# Patient Record
Sex: FEMALE | Race: ASIAN | NOT HISPANIC OR LATINO | URBAN - METROPOLITAN AREA
[De-identification: names, ages, dates, MRNs, and addresses within clinical notes are randomized per-mention and may not be internally consistent; named-entity substitution may affect disease eponyms.]

---

## 2024-08-23 ENCOUNTER — APPOINTMENT (OUTPATIENT)
Dept: URBAN - METROPOLITAN AREA CLINIC 208 | Age: 49
Setting detail: DERMATOLOGY
End: 2024-08-26

## 2024-08-23 DIAGNOSIS — L90.5 SCAR CONDITIONS AND FIBROSIS OF SKIN: ICD-10-CM

## 2024-08-23 DIAGNOSIS — L81.4 OTHER MELANIN HYPERPIGMENTATION: ICD-10-CM

## 2024-08-23 DIAGNOSIS — L57.8 OTHER SKIN CHANGES DUE TO CHRONIC EXPOSURE TO NONIONIZING RADIATION: ICD-10-CM

## 2024-08-23 DIAGNOSIS — L91.0 HYPERTROPHIC SCAR: ICD-10-CM

## 2024-08-23 DIAGNOSIS — D18.0 HEMANGIOMA: ICD-10-CM

## 2024-08-23 DIAGNOSIS — D22 MELANOCYTIC NEVI: ICD-10-CM

## 2024-08-23 PROBLEM — D22.72 MELANOCYTIC NEVI OF LEFT LOWER LIMB, INCLUDING HIP: Status: ACTIVE | Noted: 2024-08-23

## 2024-08-23 PROBLEM — D22.5 MELANOCYTIC NEVI OF TRUNK: Status: ACTIVE | Noted: 2024-08-23

## 2024-08-23 PROBLEM — D18.01 HEMANGIOMA OF SKIN AND SUBCUTANEOUS TISSUE: Status: ACTIVE | Noted: 2024-08-23

## 2024-08-23 PROBLEM — D48.5 NEOPLASM OF UNCERTAIN BEHAVIOR OF SKIN: Status: ACTIVE | Noted: 2024-08-23

## 2024-08-23 PROBLEM — D22.61 MELANOCYTIC NEVI OF RIGHT UPPER LIMB, INCLUDING SHOULDER: Status: ACTIVE | Noted: 2024-08-23

## 2024-08-23 PROCEDURE — OTHER OBSERVATION AND MEASURE: OTHER

## 2024-08-23 PROCEDURE — OTHER PHOTO-DOCUMENTATION: OTHER

## 2024-08-23 PROCEDURE — OTHER SUNSCREEN RECOMMENDATIONS: OTHER

## 2024-08-23 PROCEDURE — OTHER COUNSELING: OTHER

## 2024-08-23 PROCEDURE — OTHER OBSERVATION: OTHER

## 2024-08-23 PROCEDURE — 99203 OFFICE O/P NEW LOW 30 MIN: CPT

## 2024-08-23 PROCEDURE — OTHER MIPS QUALITY: OTHER

## 2024-08-23 PROCEDURE — OTHER OTC TREATMENT REGIMEN: OTHER

## 2024-08-23 ASSESSMENT — LOCATION SIMPLE DESCRIPTION DERM
LOCATION SIMPLE: RIGHT FOREHEAD
LOCATION SIMPLE: CHEST
LOCATION SIMPLE: RIGHT UPPER BACK
LOCATION SIMPLE: LEFT SHOULDER
LOCATION SIMPLE: TRAPEZIAL NECK
LOCATION SIMPLE: RIGHT LOWER BACK
LOCATION SIMPLE: RIGHT SHOULDER
LOCATION SIMPLE: LEFT POSTERIOR THIGH

## 2024-08-23 ASSESSMENT — LOCATION DETAILED DESCRIPTION DERM
LOCATION DETAILED: RIGHT MID-UPPER BACK
LOCATION DETAILED: RIGHT ANTERIOR SHOULDER
LOCATION DETAILED: RIGHT SUPERIOR LATERAL MIDBACK
LOCATION DETAILED: MID TRAPEZIAL NECK
LOCATION DETAILED: RIGHT INFERIOR UPPER BACK
LOCATION DETAILED: RIGHT MEDIAL FOREHEAD
LOCATION DETAILED: LEFT ANTERIOR SHOULDER
LOCATION DETAILED: LEFT LATERAL SUPERIOR CHEST
LOCATION DETAILED: RIGHT LATERAL SHOULDER
LOCATION DETAILED: LEFT PROXIMAL POSTERIOR THIGH
LOCATION DETAILED: UPPER STERNUM

## 2024-08-23 ASSESSMENT — LOCATION ZONE DERM
LOCATION ZONE: NECK
LOCATION ZONE: ARM
LOCATION ZONE: LEG
LOCATION ZONE: FACE
LOCATION ZONE: TRUNK

## 2024-08-23 NOTE — HPI: FULL BODY SKIN EXAMINATION
How Severe Are Your Spot(S)?: moderate
What Type Of Note Output Would You Prefer (Optional)?: Standard Output
What Is The Reason For Today's Visit?: Initial Full Body Skin Examination
What Is The Reason For Today's Visit? (Being Monitored For X): concerning skin lesions on an annual basis
Additional History: She denies having any moles o4 skin lesions of concern.

## 2024-08-23 NOTE — PROCEDURE: SUNSCREEN RECOMMENDATIONS
General Sunscreen Counseling: I recommended a broad spectrum sunscreen with a SPF of 30 or higher.  I explained that SPF 30 sunscreens block approximately 97 percent of the sun's harmful rays.  Sunscreens should be applied at least 15 minutes prior to expected sun exposure and then every 2 hours after that as long as sun exposure continues. If swimming or exercising sunscreen should be reapplied every 45 minutes to an hour after getting wet or sweating.  One ounce, or the equivalent of a shot glass full of sunscreen, is adequate to protect the skin not covered by a bathing suit. I also recommended a lip balm with a sunscreen as well. Sun protective clothing can be used in lieu of sunscreen but must be worn the entire time you are exposed to the sun's rays.
Detail Level: Simple
Products Recommended: Sun protective clothing of all colors can be found at sites like Coolibar, Solumbra, Solbari, UVSkinz, Naviskin, Uniglo, [place], Luminara, BloqUV\\nOutdoor brands like Lands End, Athleta, LL. Bean, Armani Zamora, [place], BAUTISTA, Free Fly, [place], Under [place] Sun [place]\\nFashion brands like Sun50, Surekha Pulitzer, Mott50, Baleaf, Epoque Evolution, J. Crew, Ninfa Mina\\nSun hats at [place] hats, Mott50, Sun'n'sand, Mallory Jose Maria, Wall[place]oo, Sunhatsbyroni, Coolibar and above outdoor brands\\n\\nSunscreen Recommendations for All Skin Colors (* = mineral based)\\nPurchase Here:\\nEltaMD Clear SPF 46; Also comes Clear Tinted (for oily/acne/rosacea/seb derm/sensitive skin)\\nEltaMD Daily (for dry skin)\\n*Colorescience Face Shield 50 (a fave)\\n*ISDIN Eryfotona Actinica (all skin types, helps with precancers)\\n*Colorscience, Supergoop and ISDIN powder brushes (great for reapplication)\\n*Skinbetter products (stick for active/sweating)\\n*Revision Intellishade (tinted; Matte and Original options; Antiaging)\\n\\nFrom Local Store:\\nCeraVe Ultra Light Moisturizing lotion SPF 30 (acne prone; very sheer)\\nCeraVe AM (what I use; good for normal or combination acne-prone)\\n*CeraVe Hydrating Mineral SPF (tinted)\\nCetaphil Pro Oil Control SPF 30 (acne prone)\\nNeutrogena Clear Face SPF 55 (acne prone)\\n*Neutrogena with Purescreen technology (good for sensitive skin; also has tinted version)\\nSupergoop Glow Screen (tinted)\\n*Supergoop Mineral Sheer Screen (also serves as a primer before makeup)\\n\\nKorean and Japanese Sunscreens From Amazon and [email or web address]- Good for Acne prone, sunburn-prone skin. Have wonderful light-weight textures that disappear into the skin!\\nBeauty of [place] - Relief Sun\\nBeauty of [place] - Matte Sun Stick\\nSKIN 1004 - [place] Centella Hyalu-Cica Water-Fit Sun Serum\\nIsntree - Hyaluronic Acid Watery Sun Gel\\nROUND LAB - Birch Juice Moisturizing Sunscreen\\nBeauty of [place] - Relief Sun\\nKao - Biore UV Aqua Rich Watery Essence Sunscreen SPF 50+ PA++++ \\n \\nFor Very Sensitive skin:\\n*Vanicream (another fave)\\n*Sunbum mineral\\n*Blue lizard sensitive\\nAveeno with feverfew moisturizer (rosacea)\\n\\nPowder sunscreens, good for reapplication and for scalp areas:\\n*Supergoop Re(setting) Mineral powder\\n*Supergoop Poof Part Powder\\n*Isdinceutics Mineral Brush\\n*Colorscience Sunforgettable Mineral Powder\\n\\nOther favorites for skin of color: *TIZO3 mineral tinted *Coola mineral or tinted Solbar *Topix Sheer Physical *Alastin Hydratint *It Cosmetics CC+ cream (also is antiaging)\\n\\nFor concerns with hyperpigmentation (skin darkening), look for tinted sunscreens with iron oxide protection from blue light - Needed for treating Melasma! Need to reapply every 2 hours, so I recommend one of the powder sunscreens for that. Add topical vitamin C and computer bluescreen protector!! Remember, visible light matters too.\\n\\nFor MEN, I'll usually recommend one of the Korean or Japanese Sunscreens (see above), Elta MD Clear or Isdin Eryfotona Actinica. Other good ones are CeraVe Ultra Light, Supergoop Unseen, La Roche Posay Melt in Milk

## 2024-08-23 NOTE — PROCEDURE: OTC TREATMENT REGIMEN
Detail Level: Simple
Patient Specific Otc Recommendations (Will Not Stick From Patient To Patient): Stratamed Scar treatment

## 2024-10-25 ENCOUNTER — APPOINTMENT (OUTPATIENT)
Dept: URBAN - METROPOLITAN AREA CLINIC 208 | Age: 49
Setting detail: DERMATOLOGY
End: 2024-10-28

## 2024-10-25 DIAGNOSIS — D22 MELANOCYTIC NEVI: ICD-10-CM

## 2024-10-25 DIAGNOSIS — D485 NEOPLASM OF UNCERTAIN BEHAVIOR OF SKIN: ICD-10-CM

## 2024-10-25 PROBLEM — D22.61 MELANOCYTIC NEVI OF RIGHT UPPER LIMB, INCLUDING SHOULDER: Status: ACTIVE | Noted: 2024-10-25

## 2024-10-25 PROBLEM — D22.72 MELANOCYTIC NEVI OF LEFT LOWER LIMB, INCLUDING HIP: Status: ACTIVE | Noted: 2024-10-25

## 2024-10-25 PROBLEM — D22.5 MELANOCYTIC NEVI OF TRUNK: Status: ACTIVE | Noted: 2024-10-25

## 2024-10-25 PROBLEM — D48.5 NEOPLASM OF UNCERTAIN BEHAVIOR OF SKIN: Status: ACTIVE | Noted: 2024-10-25

## 2024-10-25 PROCEDURE — OTHER COUNSELING: OTHER

## 2024-10-25 PROCEDURE — 11102 TANGNTL BX SKIN SINGLE LES: CPT

## 2024-10-25 PROCEDURE — OTHER BIOPSY BY SHAVE METHOD: OTHER

## 2024-10-25 PROCEDURE — OTHER PHOTO-DOCUMENTATION: OTHER

## 2024-10-25 PROCEDURE — OTHER MIPS QUALITY: OTHER

## 2024-10-25 PROCEDURE — OTHER OBSERVATION: OTHER

## 2024-10-25 PROCEDURE — 99212 OFFICE O/P EST SF 10 MIN: CPT | Mod: 25

## 2024-10-25 ASSESSMENT — LOCATION SIMPLE DESCRIPTION DERM
LOCATION SIMPLE: RIGHT SHOULDER
LOCATION SIMPLE: LEFT POSTERIOR THIGH
LOCATION SIMPLE: RIGHT LOWER BACK
LOCATION SIMPLE: LEFT CALF

## 2024-10-25 ASSESSMENT — LOCATION ZONE DERM
LOCATION ZONE: TRUNK
LOCATION ZONE: ARM
LOCATION ZONE: LEG

## 2024-10-25 ASSESSMENT — LOCATION DETAILED DESCRIPTION DERM
LOCATION DETAILED: LEFT PROXIMAL CALF
LOCATION DETAILED: LEFT PROXIMAL POSTERIOR THIGH
LOCATION DETAILED: RIGHT LATERAL SHOULDER
LOCATION DETAILED: RIGHT SUPERIOR LATERAL MIDBACK

## 2024-10-25 NOTE — PROCEDURE: BIOPSY BY SHAVE METHOD
Detail Level: Detailed
Depth Of Biopsy: dermis
Was A Bandage Applied: Yes
Size Of Lesion In Cm: 0
Biopsy Type: H and E
Biopsy Method: Dermablade
Anesthesia Type: 1% lidocaine with epinephrine
Anesthesia Volume In Cc: 1
Hemostasis: Aluminum Chloride
Wound Care: Petrolatum
Dressing: Band-Aid
Destruction After The Procedure: No
Type Of Destruction Used: Curettage
Curettage Text: The wound bed was treated with curettage after the biopsy was performed.
Cryotherapy Text: The wound bed was treated with cryotherapy after the biopsy was performed.
Electrodesiccation Text: The wound bed was treated with electrodesiccation after the biopsy was performed.
Electrodesiccation And Curettage Text: The wound bed was treated with electrodesiccation and curettage after the biopsy was performed.
Silver Nitrate Text: The wound bed was treated with silver nitrate after the biopsy was performed.
Lab: [id number]
Consent: Written consent was obtained and risks were reviewed including but not limited to scarring, infection, bleeding, scabbing, incomplete removal, nerve damage and allergy to anesthesia.
Post-Care Instructions: I reviewed with the patient in detail post-care instructions. Patient is to keep the biopsy site dry overnight, and then apply Vaseline or Aquaphor ointment or Polysporin twice daily until healed. Patient instructed to call office with any concerns.
Notification Instructions: Patient will be notified of biopsy results. However, patient instructed to call the office if not contacted within 2 weeks.
Billing Type: Third-Party Bill
Information: Selecting Yes will display possible errors in your note based on the variables you have selected. This validation is only offered as a suggestion for you. PLEASE NOTE THAT THE VALIDATION TEXT WILL BE REMOVED WHEN YOU FINALIZE YOUR NOTE. IF YOU WANT TO FAX A PRELIMINARY NOTE YOU WILL NEED TO TOGGLE THIS TO 'NO' IF YOU DO NOT WANT IT IN YOUR FAXED NOTE.

## 2024-12-02 ENCOUNTER — APPOINTMENT (OUTPATIENT)
Dept: URBAN - METROPOLITAN AREA CLINIC 208 | Age: 49
Setting detail: DERMATOLOGY
End: 2024-12-03

## 2024-12-02 DIAGNOSIS — D22 MELANOCYTIC NEVI: ICD-10-CM

## 2024-12-02 PROBLEM — D22.72 MELANOCYTIC NEVI OF LEFT LOWER LIMB, INCLUDING HIP: Status: ACTIVE | Noted: 2024-12-02

## 2024-12-02 PROCEDURE — OTHER MIPS QUALITY: OTHER

## 2024-12-02 PROCEDURE — 12031 INTMD RPR S/A/T/EXT 2.5 CM/<: CPT

## 2024-12-02 PROCEDURE — OTHER PUNCH EXCISION: OTHER

## 2024-12-02 PROCEDURE — 11401 EXC TR-EXT B9+MARG 0.6-1 CM: CPT

## 2024-12-02 ASSESSMENT — LOCATION SIMPLE DESCRIPTION DERM: LOCATION SIMPLE: LEFT CALF

## 2024-12-02 ASSESSMENT — LOCATION DETAILED DESCRIPTION DERM: LOCATION DETAILED: LEFT PROXIMAL CALF

## 2024-12-02 ASSESSMENT — LOCATION ZONE DERM: LOCATION ZONE: LEG

## 2024-12-02 NOTE — PROCEDURE: PUNCH EXCISION
Medical Necessity Clause: This procedure was medically necessary because the lesion that was treated was:
Detail Level: Detailed
Size Of Lesion (*Required): 0.3
X Size Of Lesion Width In Cm (Optional): 0
Size Of Margin In Cm: 0.15
Punch Size In Mm: 6
Repair Type: Intermediate
Intermediate / Complex Repair - Final Wound Length In Cm: 1
Complex Requirements: Extensive Undermining Performed?: No
Undermining Type: Entire Wound
Debridement Text: The wound edges were debrided prior to proceeding with the closure to facilitate wound healing.
Helical Rim Text: The closure involved the helical rim.
Vermilion Border Text: The closure involved the vermilion border.
Nostril Rim Text: The closure involved the nostril rim.
Retention Suture Text: Retention sutures were placed to support the closure and prevent dehiscence.
Anesthesia Type: 1% lidocaine with epinephrine
Hemostasis: None
Deep Sutures: 4-0 Vicryl
Epidermal Sutures: 4-0 Prolene
Epidermal Closure: simple interrupted
Wound Care: Petrolatum
Wound Dressings: a pressure dressing
Suture Removal: 14 days
Accession #: [id number]
Lab: [id number]
Path Notes (To The Dermatopathologist): Please check margins.
1.5 Mm Punch Excision Text: A 1.5 mm punch biopsy was used to excise the lesion to the level of the subcutaneous fat.  Blunt dissection was used to free the lesion from the surrounding tissues and the lesion was removed.
2 Mm Punch Excision Text: A 2 mm punch biopsy was used to excise the lesion to the level of the subcutaneous fat.  Blunt dissection was used to free the lesion from the surrounding tissues and the lesion was removed.
2.5 Mm Punch Excision Text: A 2.5 mm punch biopsy was used to excise the lesion to the level of the subcutaneous fat.  Blunt dissection was used to free the lesion from the surrounding tissues and the lesion was removed.
3 Mm Punch Excision Text: A 3 mm punch biopsy was used to excise the lesion to the level of the subcutaneous fat.  Blunt dissection was used to free the lesion from the surrounding tissues and the lesion was removed.
3.5 Mm Punch Excision Text: A 3.5 mm punch biopsy was used to excise the lesion to the level of the subcutaneous fat.  Blunt dissection was used to free the lesion from the surrounding tissues and the lesion was removed.
4 Mm Punch Excision Text: A 4 mm punch biopsy was used to excise the lesion to the level of the subcutaneous fat.  Blunt dissection was used to free the lesion from the surrounding tissues and the lesion was removed.
4.5 Mm Punch Excision Text: A 4.5 mm punch biopsy was used to excise the lesion to the level of the subcutaneous fat.  Blunt dissection was used to free the lesion from the surrounding tissues and the lesion was removed.
5 Mm Punch Excision Text: A 5 mm punch biopsy was used to excise the lesion to the level of the subcutaneous fat.  Blunt dissection was used to free the lesion from the surrounding tissues and the lesion was removed.
6 Mm Punch Excision Text: A 6 mm punch biopsy was used to excise the lesion to the level of the subcutaneous fat.  Blunt dissection was used to free the lesion from the surrounding tissues and the lesion was removed.
7 Mm Punch Excision Text: A 7 mm punch biopsy was used to excise the lesion to the level of the subcutaneous fat.  Blunt dissection was used to free the lesion from the surrounding tissues and the lesion was removed.
8 Mm Punch Excision Text: A 8 mm punch biopsy was used to excise the lesion to the level of the subcutaneous fat.  Blunt dissection was used to free the lesion from the surrounding tissues and the lesion was removed.
10 Mm Punch Excision Text: A 10 mm punch biopsy was used to excise the lesion to the level of the subcutaneous fat.  Blunt dissection was used to free the lesion from the surrounding tissues and the lesion was removed.
12 Mm Punch Excision Text: A 12 mm punch biopsy was used to excise the lesion to the level of the subcutaneous fat.  Blunt dissection was used to free the lesion from the surrounding tissues and the lesion was removed.
Consent was obtained from the patient. The risks and benefits to therapy were discussed in detail. Specifically, the risks of infection, scarring, bleeding, prolonged wound healing, incomplete removal, allergy to anesthesia, nerve injury and recurrence were addressed. Prior to the procedure, the treatment site was clearly identified and confirmed by the patient. All components of Universal Protocol/PAUSE Rule completed.
Render Post-Care Instructions In Note?: yes
Post-Care Instructions: I reviewed with the patient in detail post-care instructions. Patient is to keep the biopsy site dry overnight, and then apply Aquaphor ointment or Vaseline or Polysporin twice daily until healed. Patient may apply hydrogen peroxide soaks to remove any crusting.
Notification Instructions: Patient will be notified of biopsy results. However, patient instructed to call the office if not contacted within 2 weeks.
Billing Type: Third-Party Bill

## 2024-12-16 ENCOUNTER — APPOINTMENT (OUTPATIENT)
Dept: URBAN - METROPOLITAN AREA CLINIC 208 | Age: 49
Setting detail: DERMATOLOGY
End: 2024-12-17

## 2024-12-16 DIAGNOSIS — Z48.02 ENCOUNTER FOR REMOVAL OF SUTURES: ICD-10-CM

## 2024-12-16 PROCEDURE — OTHER MIPS QUALITY: OTHER

## 2024-12-16 PROCEDURE — 99024 POSTOP FOLLOW-UP VISIT: CPT

## 2024-12-16 PROCEDURE — OTHER SUTURE REMOVAL (GLOBAL PERIOD): OTHER

## 2024-12-16 ASSESSMENT — LOCATION DETAILED DESCRIPTION DERM: LOCATION DETAILED: LEFT PROXIMAL CALF

## 2024-12-16 ASSESSMENT — LOCATION ZONE DERM: LOCATION ZONE: LEG

## 2024-12-16 ASSESSMENT — LOCATION SIMPLE DESCRIPTION DERM: LOCATION SIMPLE: LEFT CALF

## 2024-12-16 NOTE — PROCEDURE: SUTURE REMOVAL (GLOBAL PERIOD)
Detail Level: Detailed
Add 71844 Cpt? (Important Note: In 2017 The Use Of 26693 Is Being Tracked By Cms To Determine Future Global Period Reimbursement For Global Periods): yes

## 2025-02-21 ENCOUNTER — APPOINTMENT (OUTPATIENT)
Dept: URBAN - METROPOLITAN AREA CLINIC 208 | Age: 50
Setting detail: DERMATOLOGY
End: 2025-03-02

## 2025-02-21 DIAGNOSIS — Z87.2 PERSONAL HISTORY OF DISEASES OF THE SKIN AND SUBCUTANEOUS TISSUE: ICD-10-CM

## 2025-02-21 DIAGNOSIS — L57.8 OTHER SKIN CHANGES DUE TO CHRONIC EXPOSURE TO NONIONIZING RADIATION: ICD-10-CM

## 2025-02-21 DIAGNOSIS — D18.0 HEMANGIOMA: ICD-10-CM

## 2025-02-21 DIAGNOSIS — D22 MELANOCYTIC NEVI: ICD-10-CM

## 2025-02-21 DIAGNOSIS — L91.0 HYPERTROPHIC SCAR: ICD-10-CM

## 2025-02-21 DIAGNOSIS — L81.4 OTHER MELANIN HYPERPIGMENTATION: ICD-10-CM

## 2025-02-21 PROBLEM — D23.61 OTHER BENIGN NEOPLASM OF SKIN OF RIGHT UPPER LIMB, INCLUDING SHOULDER: Status: ACTIVE | Noted: 2025-02-21

## 2025-02-21 PROBLEM — D22.72 MELANOCYTIC NEVI OF LEFT LOWER LIMB, INCLUDING HIP: Status: ACTIVE | Noted: 2025-02-21

## 2025-02-21 PROBLEM — D18.01 HEMANGIOMA OF SKIN AND SUBCUTANEOUS TISSUE: Status: ACTIVE | Noted: 2025-02-21

## 2025-02-21 PROBLEM — D22.5 MELANOCYTIC NEVI OF TRUNK: Status: ACTIVE | Noted: 2025-02-21

## 2025-02-21 PROBLEM — D22.71 MELANOCYTIC NEVI OF RIGHT LOWER LIMB, INCLUDING HIP: Status: ACTIVE | Noted: 2025-02-21

## 2025-02-21 PROBLEM — D22.61 MELANOCYTIC NEVI OF RIGHT UPPER LIMB, INCLUDING SHOULDER: Status: ACTIVE | Noted: 2025-02-21

## 2025-02-21 PROCEDURE — OTHER OBSERVATION: OTHER

## 2025-02-21 PROCEDURE — 99213 OFFICE O/P EST LOW 20 MIN: CPT

## 2025-02-21 PROCEDURE — OTHER PHOTO-DOCUMENTATION: OTHER

## 2025-02-21 PROCEDURE — OTHER COUNSELING: OTHER

## 2025-02-21 PROCEDURE — OTHER MIPS QUALITY: OTHER

## 2025-02-21 PROCEDURE — OTHER SUNSCREEN RECOMMENDATIONS: OTHER

## 2025-02-21 ASSESSMENT — LOCATION ZONE DERM
LOCATION ZONE: NECK
LOCATION ZONE: LEG
LOCATION ZONE: TRUNK
LOCATION ZONE: ARM
LOCATION ZONE: FACE

## 2025-02-21 ASSESSMENT — LOCATION SIMPLE DESCRIPTION DERM
LOCATION SIMPLE: LEFT CALF
LOCATION SIMPLE: RIGHT SHOULDER
LOCATION SIMPLE: TRAPEZIAL NECK
LOCATION SIMPLE: RIGHT PRETIBIAL REGION
LOCATION SIMPLE: RIGHT THIGH
LOCATION SIMPLE: CHEST
LOCATION SIMPLE: LEFT PRETIBIAL REGION
LOCATION SIMPLE: RIGHT POSTERIOR UPPER ARM
LOCATION SIMPLE: RIGHT FOREHEAD
LOCATION SIMPLE: LEFT POSTERIOR THIGH
LOCATION SIMPLE: RIGHT LOWER BACK
LOCATION SIMPLE: LEFT SHOULDER

## 2025-02-21 ASSESSMENT — LOCATION DETAILED DESCRIPTION DERM
LOCATION DETAILED: RIGHT DISTAL PRETIBIAL REGION
LOCATION DETAILED: LEFT PROXIMAL POSTERIOR THIGH
LOCATION DETAILED: RIGHT MEDIAL FOREHEAD
LOCATION DETAILED: LEFT ANTERIOR SHOULDER
LOCATION DETAILED: RIGHT PROXIMAL POSTERIOR UPPER ARM
LOCATION DETAILED: UPPER STERNUM
LOCATION DETAILED: LEFT PROXIMAL CALF
LOCATION DETAILED: LEFT LATERAL SUPERIOR CHEST
LOCATION DETAILED: RIGHT SUPERIOR LATERAL MIDBACK
LOCATION DETAILED: LEFT DISTAL PRETIBIAL REGION
LOCATION DETAILED: RIGHT LATERAL SHOULDER
LOCATION DETAILED: RIGHT ANTERIOR SHOULDER
LOCATION DETAILED: MID TRAPEZIAL NECK
LOCATION DETAILED: RIGHT ANTERIOR LATERAL DISTAL THIGH

## 2025-02-21 NOTE — PROCEDURE: SUNSCREEN RECOMMENDATIONS
General Sunscreen Counseling: I recommended a broad spectrum sunscreen with a SPF of 30 or higher.  I explained that SPF 30 sunscreens block approximately 97 percent of the sun's harmful rays.  Sunscreens should be applied at least 15 minutes prior to expected sun exposure and then every 2 hours after that as long as sun exposure continues. If swimming or exercising sunscreen should be reapplied every 45 minutes to an hour after getting wet or sweating.  One ounce, or the equivalent of a shot glass full of sunscreen, is adequate to protect the skin not covered by a bathing suit. I also recommended a lip balm with a sunscreen as well. Sun protective clothing can be used in lieu of sunscreen but must be worn the entire time you are exposed to the sun's rays.
Detail Level: Simple
Products Recommended: Sun protective clothing of all colors can be found at sites like Coolibar, Solumbra, Solbari, UVSkinz, Naviskin, Uniglo, [place], Luminara, BloqUV\\nOutdoor brands like Lands End, Athleta, LL. Bean, Armani Zamora, [place], BAUTISTA, Free Fly, [place], Under [place] Sun [place]\\nFashion brands like Sun50, Surekha Pulitzer, Mott50, Baleaf, Epoque Evolution, J. Crew, Ninfa Mina\\nSun hats at [place] hats, Mott50, Sun'n'sand, Mallory Jose Maria, [place], Sunhatsbyroni, Coolibar and above outdoor brands\\n\\nSunscreen Recommendations for All Skin Colors (* = mineral based)\\nPurchase Here:\\nEltaMD Clear SPF 46; Also comes Clear Tinted (for oily/acne/rosacea/seb derm/sensitive skin)\\nEltaMD Daily (for dry skin)\\n*Colorescience Face Shield 50 (a fave)\\n*ISDIN Eryfotona Actinica (all skin types, helps with precancers)\\n*Colorscience, Supergoop and ISDIN powder brushes (great for reapplication)\\n*Skinbetter products (stick for active/sweating)\\n*Revision Intellishade (tinted; Matte and Original options; Antiaging)\\n\\nFrom Local Store:\\nCeraVe Ultra Light Moisturizing lotion SPF 30 (acne prone; very sheer)\\nCeraVe AM (what I use; good for normal or combination acne-prone)\\n*CeraVe Hydrating Mineral SPF (tinted)\\nCetaphil Pro Oil Control SPF 30 (acne prone)\\nNeutrogena Clear Face SPF 55 (acne prone)\\n*Neutrogena with Purescreen technology (good for sensitive skin; also has tinted version)\\nSupergoop Glow Screen (tinted)\\n*Supergoop Mineral Sheer Screen (also serves as a primer before makeup)\\n\\nKorean and Japanese Sunscreens From Amazon and Yesstyle.com- Good for Acne prone, sunburn-prone skin. Have wonderful light-weight textures that disappear into the skin!\\nBeauty of [facility] - Relief Sun\\nBeauty of [place] - Matte Sun Stick\\nSKIN 1004 - [place] Centella Hyalu-Cica Water-Fit Sun Serum\\nIsntree - Hyaluronic Acid Watery Sun Gel\\nROUND LAB - Birch Juice Moisturizing Sunscreen\\nBeauty of [place] - Relief Sun\\nKao - Biore UV Aqua Rich Watery Essence Sunscreen SPF 50+ PA++++ \\n \\nFor Very Sensitive skin:\\n*Vanicream (another fave)\\n*Sunbum mineral\\n*Blue lizard sensitive\\nAveeno with feverfew moisturizer (rosacea)\\n\\nPowder sunscreens, good for reapplication and for scalp areas:\\n*Supergoop Re(setting) Mineral powder\\n*Supergoop Poof Part Powder\\n*Isdinceutics Mineral Brush\\n*Colorscience Sunforgettable Mineral Powder\\n\\nOther favorites for skin of color: *TIZO3 mineral tinted *[place]a mineral or tinted Solbar *Topix Sheer Physical *Alastin Hydratint *It Cosmetics CC+ cream (also is antiaging)\\n\\nFor concerns with hyperpigmentation (skin darkening), look for tinted sunscreens with iron oxide protection from blue light - Needed for treating Melasma! Need to reapply every 2 hours, so I recommend one of the powder sunscreens for that. Add topical vitamin C and computer bluescreen protector!! Remember, visible light matters too.\\n\\nFor MEN, I'll usually recommend one of the Korean or Japanese Sunscreens (see above), Elta MD Clear or Isdin Eryfotona Actinica. Other good ones are CeraVe Ultra Light, Supergoop Unseen, La Roche Posay Melt in Milk

## 2025-08-25 ENCOUNTER — APPOINTMENT (OUTPATIENT)
Dept: URBAN - METROPOLITAN AREA CLINIC 208 | Age: 50
Setting detail: DERMATOLOGY
End: 2025-08-25

## 2025-08-25 DIAGNOSIS — D485 NEOPLASM OF UNCERTAIN BEHAVIOR OF SKIN: ICD-10-CM

## 2025-08-25 DIAGNOSIS — D22 MELANOCYTIC NEVI: ICD-10-CM

## 2025-08-25 DIAGNOSIS — L82.1 OTHER SEBORRHEIC KERATOSIS: ICD-10-CM

## 2025-08-25 DIAGNOSIS — Z87.2 PERSONAL HISTORY OF DISEASES OF THE SKIN AND SUBCUTANEOUS TISSUE: ICD-10-CM

## 2025-08-25 DIAGNOSIS — L57.8 OTHER SKIN CHANGES DUE TO CHRONIC EXPOSURE TO NONIONIZING RADIATION: ICD-10-CM

## 2025-08-25 DIAGNOSIS — D18.0 HEMANGIOMA: ICD-10-CM

## 2025-08-25 PROBLEM — D48.5 NEOPLASM OF UNCERTAIN BEHAVIOR OF SKIN: Status: ACTIVE | Noted: 2025-08-25

## 2025-08-25 PROBLEM — D18.01 HEMANGIOMA OF SKIN AND SUBCUTANEOUS TISSUE: Status: ACTIVE | Noted: 2025-08-25

## 2025-08-25 PROBLEM — D22.5 MELANOCYTIC NEVI OF TRUNK: Status: ACTIVE | Noted: 2025-08-25

## 2025-08-25 PROBLEM — D22.72 MELANOCYTIC NEVI OF LEFT LOWER LIMB, INCLUDING HIP: Status: ACTIVE | Noted: 2025-08-25

## 2025-08-25 PROBLEM — D22.71 MELANOCYTIC NEVI OF RIGHT LOWER LIMB, INCLUDING HIP: Status: ACTIVE | Noted: 2025-08-25

## 2025-08-25 PROCEDURE — OTHER SUNSCREEN RECOMMENDATIONS: OTHER

## 2025-08-25 PROCEDURE — OTHER MIPS QUALITY: OTHER

## 2025-08-25 PROCEDURE — OTHER COUNSELING: OTHER

## 2025-08-25 PROCEDURE — OTHER BIOPSY BY SHAVE METHOD: OTHER

## 2025-08-25 PROCEDURE — 99213 OFFICE O/P EST LOW 20 MIN: CPT | Mod: 25

## 2025-08-25 PROCEDURE — 11102 TANGNTL BX SKIN SINGLE LES: CPT

## 2025-08-25 PROCEDURE — OTHER OBSERVATION: OTHER

## 2025-08-25 ASSESSMENT — LOCATION SIMPLE DESCRIPTION DERM
LOCATION SIMPLE: CHEST
LOCATION SIMPLE: LEFT PRETIBIAL REGION
LOCATION SIMPLE: RIGHT UPPER BACK
LOCATION SIMPLE: RIGHT SHOULDER
LOCATION SIMPLE: RIGHT PRETIBIAL REGION
LOCATION SIMPLE: RIGHT POSTERIOR THIGH
LOCATION SIMPLE: RIGHT LOWER BACK

## 2025-08-25 ASSESSMENT — LOCATION DETAILED DESCRIPTION DERM
LOCATION DETAILED: RIGHT PROXIMAL PRETIBIAL REGION
LOCATION DETAILED: LEFT LATERAL SUPERIOR CHEST
LOCATION DETAILED: LEFT DISTAL PRETIBIAL REGION
LOCATION DETAILED: RIGHT ANTERIOR SHOULDER
LOCATION DETAILED: UPPER STERNUM
LOCATION DETAILED: RIGHT INFERIOR UPPER BACK
LOCATION DETAILED: RIGHT DISTAL POSTERIOR THIGH
LOCATION DETAILED: RIGHT SUPERIOR LATERAL MIDBACK

## 2025-08-25 ASSESSMENT — LOCATION ZONE DERM
LOCATION ZONE: ARM
LOCATION ZONE: TRUNK
LOCATION ZONE: LEG